# Patient Record
Sex: MALE | Race: OTHER | HISPANIC OR LATINO | URBAN - METROPOLITAN AREA
[De-identification: names, ages, dates, MRNs, and addresses within clinical notes are randomized per-mention and may not be internally consistent; named-entity substitution may affect disease eponyms.]

---

## 2020-12-02 ENCOUNTER — NURSE TRIAGE (OUTPATIENT)
Dept: ADMINISTRATIVE | Facility: CLINIC | Age: 25
End: 2020-12-02

## 2020-12-03 ENCOUNTER — LAB VISIT (OUTPATIENT)
Dept: PRIMARY CARE CLINIC | Facility: OTHER | Age: 25
End: 2020-12-03
Attending: INTERNAL MEDICINE
Payer: OTHER GOVERNMENT

## 2020-12-03 DIAGNOSIS — Z03.818 ENCOUNTER FOR OBSERVATION FOR SUSPECTED EXPOSURE TO OTHER BIOLOGICAL AGENTS RULED OUT: ICD-10-CM

## 2020-12-03 PROCEDURE — U0003 INFECTIOUS AGENT DETECTION BY NUCLEIC ACID (DNA OR RNA); SEVERE ACUTE RESPIRATORY SYNDROME CORONAVIRUS 2 (SARS-COV-2) (CORONAVIRUS DISEASE [COVID-19]), AMPLIFIED PROBE TECHNIQUE, MAKING USE OF HIGH THROUGHPUT TECHNOLOGIES AS DESCRIBED BY CMS-2020-01-R: HCPCS

## 2020-12-03 NOTE — TELEPHONE ENCOUNTER
First attempt. Left message. Second attempt, speaking with patient, forgot address at [place] [facility], address verified with him.asking questions about testing. All questions answered.

## 2020-12-05 ENCOUNTER — NURSE TRIAGE (OUTPATIENT)
Dept: ADMINISTRATIVE | Facility: CLINIC | Age: 25
End: 2020-12-05

## 2020-12-05 DIAGNOSIS — U07.1 COVID-19 VIRUS DETECTED: ICD-10-CM

## 2020-12-05 LAB — SARS-COV-2 RNA RESP QL NAA+PROBE: DETECTED

## 2020-12-05 NOTE — TELEPHONE ENCOUNTER
Returned Pt's call with  Rosibel ID [id number]. Pt stated he tested for COVID 3 times and it keeps coming back positive. Pt stated he wants to know if he should retest after 10 days because that is what someone told him. Advised retesting is not recommended and to quarantine as instructed. Pt advised to call [facility] back for sob, difficulty breathing,chest pain or fever of 103 or higher.  Pt then stated he has chest pain that comes and goes. Advised to go to ED for evaluation. Pt verbalized understanding per .  Reason for Disposition   SEVERE or constant chest pain (Exception: mild central chest pain, present only when coughing)    Additional Information   Negative: Severe difficulty breathing (e.g., struggling for each breath, speaks in single words)   Negative: Difficult to awaken or acting confused (e.g., disoriented, slurred speech)   Negative: Bluish (or gray) lips or face now   Negative: Shock suspected (e.g., cold/pale/clammy skin, too weak to stand, low BP, rapid pulse)   Negative: Sounds like a life-threatening emergency to the triager    Protocols used: CORONAVIRUS (COVID-19) - DIAGNOSED OR [id number]

## 2020-12-06 ENCOUNTER — NURSE TRIAGE (OUTPATIENT)
Dept: ADMINISTRATIVE | Facility: CLINIC | Age: 25
End: 2020-12-06

## 2020-12-06 NOTE — TELEPHONE ENCOUNTER
Reason for Disposition   Health Information question, no triage required and triager able to answer question   General information question, no triage required and triager able to answer question    Protocols used: INFORMATION ONLY CALL-A-    eSbastian states he got a text message to call this number.  He tested positive 12/03 for Covid 19 at [facility], and says he spoke to nurse yesterday who told him to go to ED for SOB and CP.  He states he did not go, has no car.  Angolan is his first language; he is speaking English with me during this call and he is responding appropriately to my questions.  Offered  but he states he does not need or want one. Screened him during this call, no SOB, no CP, and has very mild HA only.  Of note, when I discussed with him the call from the nurse yesterday, he said clearly that he feels fine today.  He also states that he has no car, and walks where he needs to go.  He said he will go to the hospital if he feels he needs to.  Also, I explained that he is enrolled in the home monitoring program for Covid 19 symptoms.  He will receive a text daily on his phone, and if he begins to feel badly, have trouble breathing, chest pain, or gets worse in any way, then he is to call the phone number in the text message so he can speak to a nurse.  He states he didn't understand that before, but now does.  Has no additional concerns, and assured him that we are here if he needs assistance or advice.  He is also aware that if he has severe difficulty breathing or becomes very ill, that he should call 911.  He said he will.  All questions answered.